# Patient Record
Sex: FEMALE | Race: WHITE | NOT HISPANIC OR LATINO | Employment: FULL TIME | URBAN - METROPOLITAN AREA
[De-identification: names, ages, dates, MRNs, and addresses within clinical notes are randomized per-mention and may not be internally consistent; named-entity substitution may affect disease eponyms.]

---

## 2017-04-28 ENCOUNTER — HOSPITAL ENCOUNTER (EMERGENCY)
Facility: HOSPITAL | Age: 67
Discharge: HOME | End: 2017-04-28
Attending: EMERGENCY MEDICINE
Payer: MEDICARE

## 2017-04-28 VITALS
WEIGHT: 290 LBS | DIASTOLIC BLOOD PRESSURE: 73 MMHG | BODY MASS INDEX: 42.95 KG/M2 | OXYGEN SATURATION: 95 % | RESPIRATION RATE: 18 BRPM | TEMPERATURE: 98 F | HEIGHT: 69 IN | SYSTOLIC BLOOD PRESSURE: 120 MMHG | HEART RATE: 70 BPM

## 2017-04-28 DIAGNOSIS — R19.7 DIARRHEA, UNSPECIFIED TYPE: Primary | ICD-10-CM

## 2017-04-28 LAB
ALBUMIN SERPL BCP-MCNC: 3.6 G/DL
ALP SERPL-CCNC: 63 U/L
ALT SERPL W/O P-5'-P-CCNC: 40 U/L
ANION GAP SERPL CALC-SCNC: 9 MMOL/L
AST SERPL-CCNC: 37 U/L
BILIRUB SERPL-MCNC: 0.7 MG/DL
BUN SERPL-MCNC: 12 MG/DL
CALCIUM SERPL-MCNC: 8.7 MG/DL
CHLORIDE SERPL-SCNC: 110 MMOL/L
CO2 SERPL-SCNC: 22 MMOL/L
CREAT SERPL-MCNC: 0.8 MG/DL
EST. GFR  (AFRICAN AMERICAN): >60 ML/MIN/1.73 M^2
EST. GFR  (NON AFRICAN AMERICAN): >60 ML/MIN/1.73 M^2
GLUCOSE SERPL-MCNC: 109 MG/DL
MAGNESIUM SERPL-MCNC: 2 MG/DL
POTASSIUM SERPL-SCNC: 3.7 MMOL/L
PROT SERPL-MCNC: 7.2 G/DL
SODIUM SERPL-SCNC: 141 MMOL/L

## 2017-04-28 PROCEDURE — 80053 COMPREHEN METABOLIC PANEL: CPT

## 2017-04-28 PROCEDURE — 36000 PLACE NEEDLE IN VEIN: CPT

## 2017-04-28 PROCEDURE — 83735 ASSAY OF MAGNESIUM: CPT

## 2017-04-28 PROCEDURE — 99284 EMERGENCY DEPT VISIT MOD MDM: CPT

## 2017-04-28 RX ORDER — METRONIDAZOLE 500 MG/1
500 TABLET ORAL 3 TIMES DAILY
Qty: 21 TABLET | Refills: 0 | Status: SHIPPED | OUTPATIENT
Start: 2017-04-28 | End: 2017-05-05

## 2017-04-28 RX ORDER — CIPROFLOXACIN 500 MG/1
500 TABLET ORAL 2 TIMES DAILY
Qty: 6 TABLET | Refills: 0 | Status: SHIPPED | OUTPATIENT
Start: 2017-04-28 | End: 2017-05-01

## 2017-04-28 RX ORDER — ONDANSETRON 4 MG/1
4 TABLET, FILM COATED ORAL EVERY 8 HOURS PRN
Qty: 12 TABLET | Refills: 0 | Status: SHIPPED | OUTPATIENT
Start: 2017-04-28 | End: 2017-12-31

## 2017-04-28 NOTE — ED AVS SNAPSHOT
[facility]  [place] [address]               Malka De   2017  8:40 AM   ED    Description:  Female : 1950   Department:  [facility]           Your Care was Coordinated By:     Provider Role From To    Anurag Armendariz MD Attending Provider [phone] --      Reason for Visit     Diarrhea           Diagnoses this Visit        Comments    Diarrhea, unspecified type    -  Primary       ED Disposition     None           To Do List           Follow-up Information     Follow up with Rony Chatman MD In 1 week.    Specialty:  Internal Medicine    Contact information:    [place] [address]  [phone]         These Medications        Disp Refills Start End    ciprofloxacin HCl (CIPRO) 500 MG tablet 6 tablet 0 2017    Take 1 tablet (500 mg total) by mouth 2 (two) times daily. - Oral    Pharmacy: [address] Ph #: [phone]       metronidazole (FLAGYL) 500 MG tablet 21 tablet 0 2017    Take 1 tablet (500 mg total) by mouth 3 (three) times daily. - Oral    Pharmacy: [address] Ph #: [phone]       ondansetron (ZOFRAN) 4 MG tablet 12 tablet 0 2017     Take 1 tablet (4 mg total) by mouth every 8 (eight) hours as needed (Nausea and vomiting). - Oral    Pharmacy: [address] Ph #: [phone]         [facility]     [facility] Care [facility] -  Assistance  Unless otherwise directed by your provider, please contact [facility], our nurse care line that is available for  assistance.     Registered nurses in the [facility] provide: appointment scheduling, clinical advisement, health education, and other advisory services.  Call: [phone] (toll free)               Medications          "  START taking these NEW medications        Refills    ciprofloxacin HCl (CIPRO) 500 MG tablet 0    Sig: Take 1 tablet (500 mg total) by mouth 2 (two) times daily.    Class: Print    Route: Oral    metronidazole (FLAGYL) 500 MG tablet 0    Sig: Take 1 tablet (500 mg total) by mouth 3 (three) times daily.    Class: Print    Route: Oral    ondansetron (ZOFRAN) 4 MG tablet 0    Sig: Take 1 tablet (4 mg total) by mouth every 8 (eight) hours as needed (Nausea and vomiting).    Class: Print    Route: Oral           Verify that the below list of medications is an accurate representation of the medications you are currently taking.  If none reported, the list may be blank. If incorrect, please contact your healthcare provider. Carry this list with you in case of emergency.           Current Medications     amlodipine (NORVASC) 2.5 MG tablet Take 2.5 mg by mouth once daily.    aspirin (ECOTRIN) 81 MG EC tablet Take 81 mg by mouth every other day.      ciprofloxacin HCl (CIPRO) 500 MG tablet Take 1 tablet (500 mg total) by mouth 2 (two) times daily.    clotrimazole-betamethasone 1-0.05% (LOTRISONE) cream Apply to affected areas twice a day    enalapril (VASOTEC) 5 MG tablet Take 5 mg by mouth once daily.    metronidazole (FLAGYL) 500 MG tablet Take 1 tablet (500 mg total) by mouth 3 (three) times daily.    ondansetron (ZOFRAN) 4 MG tablet Take 1 tablet (4 mg total) by mouth every 8 (eight) hours as needed (Nausea and vomiting).           Clinical Reference Information           Your Vitals Were     BP Pulse Temp Resp Height Weight    153/72 85 98.1 °F (36.7 °C) 18 5' 9" (1.753 m) 131.5 kg (290 lb)    SpO2 BMI             96% 42.83 kg/m2         Allergies as of 4/28/2017     No Known Allergies      Immunizations Administered on Date of Encounter - 4/28/2017     None      ED Micro, Lab, POCT     Start Ordered       Status Ordering Provider    04/28/17 0857 04/28/17 0856  Comprehensive metabolic panel  STAT      Final result     " 04/28/17 0857 04/28/17 0856  Magnesium  STAT      Final result     04/28/17 0857 04/28/17 0856  Stool culture **CANNOT BE ORDERED STAT**  Once      Acknowledged     04/28/17 0857 04/28/17 0856  Clostridium difficile EIA  Once      Acknowledged       ED Imaging Orders     None        Discharge Instructions       Clear liquids only well you have diarrhea.  You may use Imodium right ear.  If you diarrhea last longer than 5 days total, he may begin the antibiotics as above.  You may follow-up with her primary care doctor in a week.  Rest.  Return immediately if you get worse or if new problems develop.     [facility] complies with applicable Federal civil rights laws and does not discriminate on the basis of race, color, national origin, age, disability, or sex.        Language Assistance Services     ATTENTION: Language assistance services are available, free of charge. Please call [phone].      ATENCIÓN: Si habla español, tiene a murphy disposición servicios gratuitos de asistencia lingüística. Llame al [phone].     CHÚ Ý: N?u b?n nói Ti?ng Vi?t, có các d?ch v? h? tr? ngôn ng? mi?n phí dành cho b?n. G?i s? [phone].

## 2017-04-28 NOTE — ED PROVIDER NOTES
Encounter Date: 4/28/2017    SCRIBE #1 NOTE: I, Juan Fsusy Bazzi, am scribing for, and in the presence of, Anurag Armendariz MD. Other sections scribed: HPI, ROS.       History     Chief Complaint   Patient presents with    Diarrhea     States she has had diarrhea x 2 days     Review of patient's allergies indicates:  No Known Allergies  HPI Comments: CC: Diarrhea  HPI: This 66 y.o. female with HTN, borderline DM and Hx of tubal ligation presents to the ED c/o diarrhea (4-5 episodes daily) and associated nausea that began 3 mornings ago. Pt denies fever, abdominal pain, vomiting, cough, chest pain. Pt denies any recent abx use.    The history is provided by the patient.     Past Medical History:   Diagnosis Date    Hypertension      Past Surgical History:   Procedure Laterality Date    TUBAL LIGATION       Family History   Problem Relation Age of Onset    Cancer Mother     Cancer Father     Colon cancer Father     Cancer Brother     Diabetes Maternal Grandmother     Breast cancer Neg Hx     Ovarian cancer Neg Hx      Social History   Substance Use Topics    Smoking status: Never Smoker    Smokeless tobacco: None    Alcohol use No     Review of Systems   Constitutional: Negative for chills and fever.   HENT: Negative for congestion and sore throat.    Eyes: Negative for pain and visual disturbance.   Respiratory: Negative for cough and shortness of breath.    Cardiovascular: Negative for chest pain.   Gastrointestinal: Positive for diarrhea and nausea. Negative for abdominal pain and vomiting.   Genitourinary: Negative for difficulty urinating and dysuria.   Musculoskeletal: Negative for arthralgias and myalgias.   Skin: Negative for rash and wound.   Neurological: Negative for dizziness and headaches.       Physical Exam   Initial Vitals   BP Pulse Resp Temp SpO2   04/28/17 0832 04/28/17 0832 04/28/17 0832 04/28/17 0832 04/28/17 0832   153/72 85 18 98.1 °F (36.7 °C) 96 %     Physical Exam  The patient was  examined specifically for the following:   General:No significant distress, Good color, Warm and dry. Head and neck:Scalp atraumatic, Neck supple. Neurological:Appropriate conversation, Gross motor deficits. Eyes:Conjugate gaze, Clear corneas. ENT: No epistaxis. Cardiac: Regular rate and rhythm, Grossly normal heart tones. Pulmonary: Wheezing, Rales. Gastrointestinal: Abdominal tenderness, Abdominal distention. Musculoskeletal: Extremity deformity, Apparent pain with range of motion of the joints. Skin: Rash.   The findings on examination were normal except for the following: The patient is morbidly obese.  Lungs are clear the heart tones are normal the abdomen is nontender.  ED Course   Procedures  Labs Reviewed   COMPREHENSIVE METABOLIC PANEL - Abnormal; Notable for the following:        Result Value    CO2 22 (*)     All other components within normal limits   MAGNESIUM     Medical decision making: Given the above this patient is having 5 episodes of diarrhea day.  She is had her symptoms for 2 days.  I will give her Cipro and Flagyl prescription to use if she has diarrhea for 5 days.  I will treat the patient for nausea.  I will advise clear liquid diet until she is well.  There is no fever or tachycardia.  The patient is not clinically dehydrated.  There are no significant electrolyte abnormalities.  I'll have the patient return if she gets worse or if new problems develop.                     Scribe Attestation:   Scribe #1: I performed the above scribed service and the documentation accurately describes the services I performed. I attest to the accuracy of the note.    Attending Attestation:           Physician Attestation for Scribe:  Physician Attestation Statement for Scribe #1: I, Anurag Armendariz MD, reviewed documentation, as scribed by Juan F Bazzi in my presence, and it is both accurate and complete.                 ED Course     Clinical Impression:   The encounter diagnosis was Diarrhea, unspecified  type.          Anurag Armendariz MD  04/28/17 2005

## 2017-04-28 NOTE — ED TRIAGE NOTES
Pt arrived to ED due to diarrhea since Tuesday night. Denies abdominal pain. Denies Nausea at this time as well as abdominal pain. Pt denies fever and chills

## 2017-04-28 NOTE — DISCHARGE INSTRUCTIONS
Clear liquids only well you have diarrhea.  You may use Imodium right ear.  If you diarrhea last longer than 5 days total, he may begin the antibiotics as above.  You may follow-up with her primary care doctor in a week.  Rest.  Return immediately if you get worse or if new problems develop.

## 2017-12-31 ENCOUNTER — HOSPITAL ENCOUNTER (EMERGENCY)
Facility: HOSPITAL | Age: 67
Discharge: HOME | End: 2017-12-31
Attending: EMERGENCY MEDICINE
Payer: MEDICARE

## 2017-12-31 VITALS
RESPIRATION RATE: 20 BRPM | BODY MASS INDEX: 44.41 KG/M2 | HEIGHT: 68 IN | SYSTOLIC BLOOD PRESSURE: 122 MMHG | OXYGEN SATURATION: 96 % | HEART RATE: 84 BPM | TEMPERATURE: 98 F | WEIGHT: 293 LBS | DIASTOLIC BLOOD PRESSURE: 59 MMHG

## 2017-12-31 DIAGNOSIS — J10.1 INFLUENZA A: ICD-10-CM

## 2017-12-31 DIAGNOSIS — B34.9 VIRAL ILLNESS: Primary | ICD-10-CM

## 2017-12-31 LAB
BILIRUB UR QL STRIP: NEGATIVE
CLARITY UR: ABNORMAL
COLOR UR: YELLOW
FLUAV AG SPEC QL IA: POSITIVE
FLUBV AG SPEC QL IA: NEGATIVE
GLUCOSE UR QL STRIP: NEGATIVE
HGB UR QL STRIP: NEGATIVE
KETONES UR QL STRIP: NEGATIVE
LEUKOCYTE ESTERASE UR QL STRIP: NEGATIVE
NITRITE UR QL STRIP: NEGATIVE
PH UR STRIP: 6 [PH] (ref 5–8)
POCT GLUCOSE: 123 MG/DL (ref 70–110)
PROT UR QL STRIP: NEGATIVE
SP GR UR STRIP: 1.01 (ref 1–1.03)
SPECIMEN SOURCE: ABNORMAL
URN SPEC COLLECT METH UR: ABNORMAL
UROBILINOGEN UR STRIP-ACNC: NEGATIVE EU/DL

## 2017-12-31 PROCEDURE — 96360 HYDRATION IV INFUSION INIT: CPT

## 2017-12-31 PROCEDURE — 82962 GLUCOSE BLOOD TEST: CPT

## 2017-12-31 PROCEDURE — 81003 URINALYSIS AUTO W/O SCOPE: CPT

## 2017-12-31 PROCEDURE — 87400 INFLUENZA A/B EACH AG IA: CPT | Mod: 59

## 2017-12-31 PROCEDURE — 99284 EMERGENCY DEPT VISIT MOD MDM: CPT | Mod: 25

## 2017-12-31 PROCEDURE — 25000003 PHARM REV CODE 250: Performed by: PHYSICIAN ASSISTANT

## 2017-12-31 RX ORDER — CETIRIZINE HYDROCHLORIDE 10 MG/1
10 TABLET ORAL
Status: COMPLETED | OUTPATIENT
Start: 2017-12-31 | End: 2017-12-31

## 2017-12-31 RX ORDER — CETIRIZINE HYDROCHLORIDE 10 MG/1
10 TABLET ORAL DAILY
Qty: 30 TABLET | Refills: 0 | Status: SHIPPED | OUTPATIENT
Start: 2017-12-31 | End: 2018-08-24

## 2017-12-31 RX ORDER — HYDROCODONE BITARTRATE AND HOMATROPINE METHYLBROMIDE ORAL SOLUTION 5; 1.5 MG/5ML; MG/5ML
5 LIQUID ORAL EVERY 6 HOURS PRN
Qty: 120 ML | Refills: 0 | Status: SHIPPED | OUTPATIENT
Start: 2017-12-31 | End: 2018-08-24

## 2017-12-31 RX ORDER — GUAIFENESIN/DEXTROMETHORPHAN 100-10MG/5
10 SYRUP ORAL ONCE
Status: COMPLETED | OUTPATIENT
Start: 2017-12-31 | End: 2017-12-31

## 2017-12-31 RX ORDER — ACETAMINOPHEN 500 MG
500 TABLET ORAL EVERY 6 HOURS PRN
Qty: 20 TABLET | Refills: 0 | Status: SHIPPED | OUTPATIENT
Start: 2017-12-31

## 2017-12-31 RX ORDER — GUAIFENESIN 600 MG/1
1200 TABLET, EXTENDED RELEASE ORAL 2 TIMES DAILY
COMMUNITY
End: 2018-08-24

## 2017-12-31 RX ADMIN — GUAIFENESIN AND DEXTROMETHORPHAN 10 ML: 100; 10 SYRUP ORAL at 07:12

## 2017-12-31 RX ADMIN — CETIRIZINE HYDROCHLORIDE 10 MG: 10 TABLET, FILM COATED ORAL at 07:12

## 2017-12-31 RX ADMIN — SODIUM CHLORIDE 1000 ML: 0.9 INJECTION, SOLUTION INTRAVENOUS at 07:12

## 2017-12-31 NOTE — DISCHARGE INSTRUCTIONS
You have been evaluated in the emergency department.  You are being treated for influenza.    Drink lots of fluids.  Get good rest.  Continue with Tylenol for fever or pain.  Hycodan for cough, especially in the evenings.  Be aware, Hycodan is sedating.  Do not mix with other sedating medications.  Zyrtec daily.    Follow-up with your primary care physician early this week for reevaluation and further management.    Return to this ED if fever persists despite treatment, if you begin with worsening cough, if you begin with chest pain or shortness of breath, if any other problems occur.

## 2017-12-31 NOTE — ED PROVIDER NOTES
Encounter Date: 12/31/2017    SCRIBE #1 NOTE: I, Sharon Tipton, am scribing for, and in the presence of,  CRISTINA Cox. I have scribed the following portions of the note - Other sections scribed: HPI and ROS.       History     Chief Complaint   Patient presents with    Fever     chills, fever, nonproductive cough, chest pain from coughing, sore throat, diarrhea, urinary frequency x2 days; 8:30pm took tylenol     Chief Complaint: Cough    HPI: This 67 y.o. Female with HTN presents to the ED c/o a non productive cough. Symptoms began 3 days ago. Cough is constant, severe, and has progressively worsened since onset. There's associated appetite decrease, generalized body aches, dizziness, generalized weakness, and chest pain secondary to cough. There's no attempted treatment. Symptoms have gradually worsened since onset. Patient also reports urinary frequency and endorses being on antibiotic treatment secondary to an UTI 1 week ago. At this time, she denies fever, chills, nausea, vomiting, diarrhea, abdominal pain, or dysuria. No recent hospitalizations.         The history is provided by the patient. No  was used.     Review of patient's allergies indicates:  No Known Allergies  Past Medical History:   Diagnosis Date    Hypertension      Past Surgical History:   Procedure Laterality Date    TUBAL LIGATION       Family History   Problem Relation Age of Onset    Cancer Mother     Cancer Father     Colon cancer Father     Cancer Brother     Diabetes Maternal Grandmother     Breast cancer Neg Hx     Ovarian cancer Neg Hx      Social History   Substance Use Topics    Smoking status: Never Smoker    Smokeless tobacco: Not on file    Alcohol use No     Review of Systems   Constitutional: Positive for appetite change. Negative for chills and fever.   HENT: Negative for ear pain and sore throat.    Eyes: Negative for pain.   Respiratory: Positive for cough. Negative for shortness of  breath.    Cardiovascular: Positive for chest pain.   Gastrointestinal: Negative for abdominal pain, diarrhea, nausea and vomiting.   Genitourinary: Positive for frequency. Negative for dysuria and hematuria.   Musculoskeletal: Positive for myalgias (arm or leg pain).   Skin: Negative for rash.   Neurological: Positive for dizziness and weakness. Negative for headaches.       Physical Exam     Initial Vitals   BP Pulse Resp Temp SpO2   12/31/17 0404 12/31/17 0404 12/31/17 0404 12/31/17 0404 12/31/17 0409   (!) 141/63 101 16 98.5 °F (36.9 °C) (!) 94 %      MAP       12/31/17 0404       89         Physical Exam    Nursing note and vitals reviewed.  Constitutional: She appears well-developed and well-nourished. She is not diaphoretic. No distress.   HENT:   Head: Normocephalic and atraumatic.   Right Ear: External ear normal.   Left Ear: External ear normal.   Mouth/Throat: Oropharynx is clear and moist.   Eyes: Conjunctivae and EOM are normal. Pupils are equal, round, and reactive to light.   Neck: Normal range of motion. Neck supple. No tracheal deviation present.   Cardiovascular: Normal heart sounds and intact distal pulses.   Pulmonary/Chest: Breath sounds normal. No stridor. No respiratory distress. She has no wheezes. She has no rhonchi. She exhibits no tenderness.   Lungs overall clear.  Upper airway noise.   Abdominal: Soft. Bowel sounds are normal. She exhibits no distension. There is no tenderness.   Musculoskeletal: Normal range of motion. She exhibits no tenderness.   Lymphadenopathy:     She has no cervical adenopathy.   Neurological: She is alert and oriented to person, place, and time.   Skin: Skin is warm and dry. Capillary refill takes less than 2 seconds.   Psychiatric: She has a normal mood and affect. Her behavior is normal. Judgment and thought content normal.         ED Course   Procedures  Labs Reviewed   INFLUENZA A AND B ANTIGEN - Abnormal; Notable for the following:        Result Value     Influenza A Ag, EIA Positive (*)     All other components within normal limits   URINALYSIS - Abnormal; Notable for the following:     Appearance, UA Hazy (*)     All other components within normal limits     EKG Readings: (Independently Interpreted)   EKG in normal sinus rhythm, ventricular rate 99 bpm.  Normal WV, normal QT interval.  No evidence of ischemia, arrhythmia, or heart block.  No ST elevation.          Medical Decision Making:   Initial Assessment:   67-year-old female chief complaint nonproductive cough, myalgias, weakness, decreased appetite, all ×4 days.  Differential Diagnosis:   Viral URI, pharyngitis, otitis media, otitis externa, pneumonia, bronchitis  ED Management:  Patient overall well-appearing, in no acute distress, afebrile, vitals within normal limits.    Patient presents to ED complaining of various URI-like symptoms ×4 days.  Patient admits to decreased by mouth intake, weakness, nonproductive cough, myalgias.  No treatment attempted prior to arrival.  She denies shortness of breath.  She denies chest pain, however does admit to pain to chest wall during cough.  Her EKG is negative.  She has a history of hypertension but no other significant cardiac issues.  I do not suspect cardiogenic process at this time.  Chest x-ray negative for consolidation, effusion, pneumothorax, or obvious bony abnormality.  Her lungs are clear bilaterally.  I do not suspect pneumonia or pleural process at this time.  Patient is overall well-appearing and nontoxic.  She is tachycardic, therefore IV fluids given.  She is afebrile.  No rib retractions or nasal flaring, no tachypnea.  SPO2 96% on room air.  She is no respiratory distress.  I do suspect viral process as culprit of patient's symptoms.    Influenza swab positive for flu a.  I will treat supportively, provide Hycodan cough syrup to help with cough especially in the evenings, and have her follow-up with her primary early next week.  She does understand  and agree with treatment plan.  I do feel she is safe and stable for discharge without further intervention and management as an outpatient.  Return precautions given.  Other:   I have discussed this case with another health care provider.       <> Summary of the Discussion: I discussed this case with Dr. Armendariz.            Scribe Attestation:   Scribe #1: I performed the above scribed service and the documentation accurately describes the services I performed. I attest to the accuracy of the note.    Attending Attestation:     Physician Attestation Statement for NP/PA:   I discussed this assessment and plan of this patient with the NP/PA, but I did not personally examine the patient. The face to face encounter was performed by the NP/PA.        Physician Attestation for Scribe:  Physician Attestation Statement for Scribe #1: I, CRISTINA Cox, reviewed documentation, as scribed by Sharon Tipton in my presence, and it is both accurate and complete.                 ED Course      Clinical Impression:   The primary encounter diagnosis was Viral illness. A diagnosis of Influenza A was also pertinent to this visit.    Disposition:   Disposition: Discharged  Condition: Stable                        SERENA MartinezC  12/31/17 1158       Anurag Armendariz MD  01/02/18 0811

## 2018-08-24 ENCOUNTER — HOSPITAL ENCOUNTER (EMERGENCY)
Facility: HOSPITAL | Age: 68
Discharge: HOME | End: 2018-08-24
Attending: EMERGENCY MEDICINE
Payer: MEDICARE

## 2018-08-24 VITALS
OXYGEN SATURATION: 92 % | WEIGHT: 250 LBS | TEMPERATURE: 99 F | HEART RATE: 81 BPM | DIASTOLIC BLOOD PRESSURE: 67 MMHG | BODY MASS INDEX: 37.03 KG/M2 | HEIGHT: 69 IN | RESPIRATION RATE: 20 BRPM | SYSTOLIC BLOOD PRESSURE: 144 MMHG

## 2018-08-24 DIAGNOSIS — S16.1XXA STRAIN OF NECK MUSCLE, INITIAL ENCOUNTER: ICD-10-CM
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
## 2018-08-24 DIAGNOSIS — M25.512 LEFT SHOULDER PAIN: ICD-10-CM

## 2018-08-24 DIAGNOSIS — S46.912A STRAIN OF LEFT SHOULDER, INITIAL ENCOUNTER: ICD-10-CM

## 2018-08-24 PROCEDURE — 63600175 PHARM REV CODE 636 W HCPCS: Performed by: NURSE PRACTITIONER

## 2018-08-24 PROCEDURE — 99283 EMERGENCY DEPT VISIT LOW MDM: CPT | Mod: 25

## 2018-08-24 PROCEDURE — 96372 THER/PROPH/DIAG INJ SC/IM: CPT

## 2018-08-24 RX ORDER — KETOROLAC TROMETHAMINE 30 MG/ML
30 INJECTION, SOLUTION INTRAMUSCULAR; INTRAVENOUS
Status: COMPLETED | OUTPATIENT
Start: 2018-08-24 | End: 2018-08-24

## 2018-08-24 RX ORDER — ASPIRIN 81 MG/1
81 TABLET ORAL DAILY
COMMUNITY

## 2018-08-24 RX ORDER — NAPROXEN 500 MG/1
500 TABLET ORAL 2 TIMES DAILY PRN
Qty: 15 TABLET | Refills: 0 | Status: SHIPPED | OUTPATIENT
Start: 2018-08-24

## 2018-08-24 RX ORDER — TIZANIDINE 4 MG/1
4 TABLET ORAL EVERY 8 HOURS PRN
Qty: 20 TABLET | Refills: 0 | Status: SHIPPED | OUTPATIENT
Start: 2018-08-24

## 2018-08-24 RX ADMIN — KETOROLAC TROMETHAMINE 30 MG: 30 INJECTION INTRAMUSCULAR; INTRAVENOUS at 02:08
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
## 2018-08-24 NOTE — DISCHARGE INSTRUCTIONS
Take pain medications as needed only as prescribed.    Follow-up with her primary physician for further management if your symptoms do not improve.    Return to the emergency department for any new or worsening symptoms or as needed.

## 2020-03-20 ENCOUNTER — HOSPITAL ENCOUNTER (EMERGENCY)
Facility: HOSPITAL | Age: 70
Discharge: HOME | End: 2020-03-20
Attending: EMERGENCY MEDICINE
Payer: MEDICARE

## 2020-03-20 VITALS
TEMPERATURE: 98 F | WEIGHT: 280 LBS | OXYGEN SATURATION: 99 % | SYSTOLIC BLOOD PRESSURE: 151 MMHG | DIASTOLIC BLOOD PRESSURE: 70 MMHG | HEART RATE: 93 BPM | RESPIRATION RATE: 17 BRPM | BODY MASS INDEX: 41.35 KG/M2

## 2020-03-20 DIAGNOSIS — N39.0 URINARY TRACT INFECTION WITHOUT HEMATURIA, SITE UNSPECIFIED: ICD-10-CM

## 2020-03-20 DIAGNOSIS — R53.1 WEAKNESS: Primary | ICD-10-CM

## 2020-03-20 LAB
ALBUMIN SERPL BCP-MCNC: 4.4 G/DL (ref 3.5–5.2)
ALP SERPL-CCNC: 99 U/L (ref 55–135)
ALT SERPL W/O P-5'-P-CCNC: 30 U/L (ref 10–44)
ANION GAP SERPL CALC-SCNC: 10 MMOL/L (ref 8–16)
AST SERPL-CCNC: 26 U/L (ref 10–40)
BACTERIA #/AREA URNS HPF: ABNORMAL /HPF
BASOPHILS # BLD AUTO: 0.03 K/UL (ref 0–0.2)
BASOPHILS NFR BLD: 0.3 % (ref 0–1.9)
BILIRUB SERPL-MCNC: 0.6 MG/DL (ref 0.1–1)
BILIRUB UR QL STRIP: NEGATIVE
BNP SERPL-MCNC: 21 PG/ML (ref 0–99)
BUN SERPL-MCNC: 12 MG/DL (ref 8–23)
CALCIUM SERPL-MCNC: 9.9 MG/DL (ref 8.7–10.5)
CHLORIDE SERPL-SCNC: 105 MMOL/L (ref 95–110)
CLARITY UR: ABNORMAL
CO2 SERPL-SCNC: 26 MMOL/L (ref 23–29)
COLOR UR: YELLOW
CREAT SERPL-MCNC: 1 MG/DL (ref 0.5–1.4)
DIFFERENTIAL METHOD: NORMAL
EOSINOPHIL # BLD AUTO: 0.1 K/UL (ref 0–0.5)
EOSINOPHIL NFR BLD: 0.6 % (ref 0–8)
ERYTHROCYTE [DISTWIDTH] IN BLOOD BY AUTOMATED COUNT: 13.2 % (ref 11.5–14.5)
EST. GFR  (AFRICAN AMERICAN): >60 ML/MIN/1.73 M^2
EST. GFR  (NON AFRICAN AMERICAN): 58 ML/MIN/1.73 M^2
GLUCOSE SERPL-MCNC: 111 MG/DL (ref 70–110)
GLUCOSE UR QL STRIP: NEGATIVE
HCT VFR BLD AUTO: 47.8 % (ref 37–48.5)
HGB BLD-MCNC: 15.7 G/DL (ref 12–16)
HGB UR QL STRIP: NEGATIVE
IMM GRANULOCYTES # BLD AUTO: 0.02 K/UL (ref 0–0.04)
IMM GRANULOCYTES NFR BLD AUTO: 0.2 % (ref 0–0.5)
KETONES UR QL STRIP: NEGATIVE
LEUKOCYTE ESTERASE UR QL STRIP: ABNORMAL
LYMPHOCYTES # BLD AUTO: 1.9 K/UL (ref 1–4.8)
LYMPHOCYTES NFR BLD: 19.5 % (ref 18–48)
MCH RBC QN AUTO: 29.7 PG (ref 27–31)
MCHC RBC AUTO-ENTMCNC: 32.8 G/DL (ref 32–36)
MCV RBC AUTO: 91 FL (ref 82–98)
MICROSCOPIC COMMENT: ABNORMAL
MONOCYTES # BLD AUTO: 1 K/UL (ref 0.3–1)
MONOCYTES NFR BLD: 10 % (ref 4–15)
NEUTROPHILS # BLD AUTO: 6.7 K/UL (ref 1.8–7.7)
NEUTROPHILS NFR BLD: 69.4 % (ref 38–73)
NITRITE UR QL STRIP: NEGATIVE
NRBC BLD-RTO: 0 /100 WBC
PH UR STRIP: 7 [PH] (ref 5–8)
PLATELET # BLD AUTO: 187 K/UL (ref 150–350)
PMV BLD AUTO: 11.1 FL (ref 9.2–12.9)
POTASSIUM SERPL-SCNC: 3.8 MMOL/L (ref 3.5–5.1)
PROT SERPL-MCNC: 8.2 G/DL (ref 6–8.4)
PROT UR QL STRIP: NEGATIVE
RBC # BLD AUTO: 5.28 M/UL (ref 4–5.4)
RBC #/AREA URNS HPF: 3 /HPF (ref 0–4)
SODIUM SERPL-SCNC: 141 MMOL/L (ref 136–145)
SP GR UR STRIP: 1.01 (ref 1–1.03)
SQUAMOUS #/AREA URNS HPF: 4 /HPF
TROPONIN I SERPL DL<=0.01 NG/ML-MCNC: <0.006 NG/ML (ref 0–0.03)
URN SPEC COLLECT METH UR: ABNORMAL
UROBILINOGEN UR STRIP-ACNC: NEGATIVE EU/DL
WBC # BLD AUTO: 9.62 K/UL (ref 3.9–12.7)
WBC #/AREA URNS HPF: 4 /HPF (ref 0–5)

## 2020-03-20 PROCEDURE — 81000 URINALYSIS NONAUTO W/SCOPE: CPT

## 2020-03-20 PROCEDURE — 85025 COMPLETE CBC W/AUTO DIFF WBC: CPT

## 2020-03-20 PROCEDURE — 63600175 PHARM REV CODE 636 W HCPCS: Performed by: EMERGENCY MEDICINE

## 2020-03-20 PROCEDURE — 84484 ASSAY OF TROPONIN QUANT: CPT

## 2020-03-20 PROCEDURE — 96360 HYDRATION IV INFUSION INIT: CPT

## 2020-03-20 PROCEDURE — 99285 EMERGENCY DEPT VISIT HI MDM: CPT | Mod: 25

## 2020-03-20 PROCEDURE — 96361 HYDRATE IV INFUSION ADD-ON: CPT

## 2020-03-20 PROCEDURE — 80053 COMPREHEN METABOLIC PANEL: CPT

## 2020-03-20 PROCEDURE — 25000003 PHARM REV CODE 250: Performed by: EMERGENCY MEDICINE

## 2020-03-20 PROCEDURE — 83880 ASSAY OF NATRIURETIC PEPTIDE: CPT

## 2020-03-20 PROCEDURE — 93010 EKG 12-LEAD: ICD-10-PCS | Mod: ,,, | Performed by: INTERNAL MEDICINE

## 2020-03-20 PROCEDURE — 93010 ELECTROCARDIOGRAM REPORT: CPT | Mod: ,,, | Performed by: INTERNAL MEDICINE

## 2020-03-20 PROCEDURE — 93005 ELECTROCARDIOGRAM TRACING: CPT

## 2020-03-20 RX ORDER — HYDROXYZINE PAMOATE 25 MG/1
25 CAPSULE ORAL
Status: COMPLETED | OUTPATIENT
Start: 2020-03-20 | End: 2020-03-20

## 2020-03-20 RX ORDER — HYDROXYZINE HYDROCHLORIDE 25 MG/1
25 TABLET, FILM COATED ORAL EVERY 6 HOURS
Qty: 12 TABLET | Refills: 0 | Status: SHIPPED | OUTPATIENT
Start: 2020-03-20

## 2020-03-20 RX ORDER — ONDANSETRON 4 MG/1
4 TABLET, FILM COATED ORAL
COMMUNITY
Start: 2020-03-17 | End: 2020-03-27

## 2020-03-20 RX ORDER — BUPROPION HYDROCHLORIDE 300 MG/1
300 TABLET ORAL
COMMUNITY
Start: 2019-12-19

## 2020-03-20 RX ORDER — CEPHALEXIN 500 MG/1
500 CAPSULE ORAL 2 TIMES DAILY
Qty: 10 CAPSULE | Refills: 0 | Status: SHIPPED | OUTPATIENT
Start: 2020-03-20 | End: 2020-03-25

## 2020-03-20 RX ORDER — BUSPIRONE HYDROCHLORIDE 10 MG/1
10 TABLET ORAL
COMMUNITY
Start: 2019-12-19 | End: 2020-12-18

## 2020-03-20 RX ORDER — HYDROXYZINE HYDROCHLORIDE 25 MG/1
25 TABLET, FILM COATED ORAL EVERY 6 HOURS
Qty: 12 TABLET | Refills: 0 | Status: SHIPPED | OUTPATIENT
Start: 2020-03-20 | End: 2020-03-20 | Stop reason: SDUPTHER

## 2020-03-20 RX ORDER — CEPHALEXIN 250 MG/1
500 CAPSULE ORAL
Status: COMPLETED | OUTPATIENT
Start: 2020-03-20 | End: 2020-03-20

## 2020-03-20 RX ORDER — CEPHALEXIN 500 MG/1
500 CAPSULE ORAL 2 TIMES DAILY
Qty: 10 CAPSULE | Refills: 0 | Status: SHIPPED | OUTPATIENT
Start: 2020-03-20 | End: 2020-03-20 | Stop reason: SDUPTHER

## 2020-03-20 RX ORDER — ONDANSETRON 2 MG/ML
4 INJECTION INTRAMUSCULAR; INTRAVENOUS
Status: DISCONTINUED | OUTPATIENT
Start: 2020-03-20 | End: 2020-03-20 | Stop reason: HOSPADM

## 2020-03-20 RX ADMIN — SODIUM CHLORIDE 1000 ML: 0.9 INJECTION, SOLUTION INTRAVENOUS at 05:03

## 2020-03-20 RX ADMIN — HYDROXYZINE PAMOATE 25 MG: 25 CAPSULE ORAL at 06:03

## 2020-03-20 RX ADMIN — CEPHALEXIN 500 MG: 250 CAPSULE ORAL at 07:03

## 2020-03-20 NOTE — ED PROVIDER NOTES
"Encounter Date: 3/20/2020    SCRIBE #1 NOTE: I, Thao Matta, am scribing for, and in the presence of,  Hakan Roberson MD. I have scribed the following portions of the note - Other sections scribed: HPI/ROS/PE.       History     Chief Complaint   Patient presents with    Abnormal EKG     at Urgent care, sent her over here to have further testing. pt states she originally went in for anxiety symptoms and nausea. pt denies chest pain.      This 69 y.o. female with a medical history of GERD and HTN presents to the ED for an emergent evaluation following an abnormal EKG. Pt reports she was evaluated at an Urgent Care earlier today for anxiety-related symptoms. She had an EKG performed and was sent to the ED for a further evaluation. Pt reports she has been feeling "sick to my stomach" noting she has been panicking. Pt reports nausea for the last 3 days or so as well as "sweats." Pt notes a hx of depression stating she takes anti-depressants. Pt states that she took x1 tablet of an anti-anxiety medication today. Pt states that her son passed way ~1 year ago to which is making her depressed. No known allergies to medications. No alleviating factors. Otherwise, pt denies n/v, fever, chills, abdominal pain, chest pain, SOB, nasal congestion, and any other associated symptoms.    The history is provided by the patient. No  was used.     Review of patient's allergies indicates:  No Known Allergies  Past Medical History:   Diagnosis Date    GERD (gastroesophageal reflux disease)     Hypertension      Past Surgical History:   Procedure Laterality Date    TUBAL LIGATION       Family History   Problem Relation Age of Onset    Cancer Mother     Cancer Father     Colon cancer Father     Cancer Brother     Diabetes Maternal Grandmother     Breast cancer Neg Hx     Ovarian cancer Neg Hx      Social History     Tobacco Use    Smoking status: Never Smoker    Smokeless tobacco: Never Used " "  Substance Use Topics    Alcohol use: No    Drug use: No     Review of Systems   Constitutional: Positive for diaphoresis ("sweats").   HENT: Negative.    Eyes: Negative.    Respiratory: Negative.    Cardiovascular: Negative.    Gastrointestinal: Positive for nausea.   Genitourinary: Negative.    Musculoskeletal: Negative.    Skin: Negative.    Neurological: Negative.    Psychiatric/Behavioral: Positive for dysphoric mood. The patient is nervous/anxious.        Physical Exam     Initial Vitals [03/20/20 1639]   BP Pulse Resp Temp SpO2   (!) 161/92 92 19 98.4 °F (36.9 °C) 95 %      MAP       --         Physical Exam    Nursing note and vitals reviewed.  Constitutional: She appears well-developed and well-nourished. She is not diaphoretic. No distress.   HENT:   Head: Atraumatic.   Eyes: EOM are normal.   Neck: Normal range of motion. Neck supple.   Cardiovascular: Normal rate and regular rhythm.   Pulmonary/Chest: Breath sounds normal. No respiratory distress.   Abdominal: Soft. There is no tenderness.   Musculoskeletal: She exhibits no edema.   Neurological: She is alert and oriented to person, place, and time.   Skin: Skin is warm and dry. No rash noted.   Psychiatric: Her mood appears anxious.         ED Course   Procedures  Labs Reviewed   COMPREHENSIVE METABOLIC PANEL - Abnormal; Notable for the following components:       Result Value    Glucose 111 (*)     eGFR if non  58 (*)     All other components within normal limits   URINALYSIS, REFLEX TO URINE CULTURE - Abnormal; Notable for the following components:    Appearance, UA Hazy (*)     Leukocytes, UA Trace (*)     All other components within normal limits    Narrative:     Preferred Collection Type->Urine, Clean Catch   URINALYSIS MICROSCOPIC - Abnormal; Notable for the following components:    Bacteria Many (*)     All other components within normal limits    Narrative:     Preferred Collection Type->Urine, Clean Catch   CBC W/ AUTO " DIFFERENTIAL   TROPONIN I   B-TYPE NATRIURETIC PEPTIDE          Imaging Results          X-Ray Chest AP Portable (Final result)  Result time 03/20/20 18:22:34    Final result by Travis Crook MD (03/20/20 18:22:34)                 Impression:      1. Slight vascular crowding projected over the right lower lung zone, likely related to shallow inspiratory effort and or atelectasis however developing consolidation is not excluded.  Correlation is recommended.  PA and lateral as warranted.      Electronically signed by: Travis Crook MD  Date:    03/20/2020  Time:    18:22             Narrative:    EXAMINATION:  XR CHEST AP PORTABLE    CLINICAL HISTORY:  Weakness    TECHNIQUE:  Single frontal view of the chest was performed.    COMPARISON:  12/31/2017    FINDINGS:  The cardiomediastinal silhouette is not enlarged.  There is no pleural effusion.  The trachea is midline.  The lungs are symmetrically expanded bilaterally with mild vascular crowding projected over the right lower lung zone.  There is left basilar subsegmental atelectasis..  No large focal consolidation seen.  There is no pneumothorax.  The osseous structures are remarkable for degenerative changes..                                            Scribe Attestation:   Scribe #1: I performed the above scribed service and the documentation accurately describes the services I performed. I attest to the accuracy of the note.      Pt presentign with some anxiety and nausea from UC, with concerns for abnormal EKG.  EKG showing nsr, RBBB, normal rate, no ischemic pattern noted, no STEMI.  ED workup showing neg trop, BNP wnl, CBC/CMP wnl, ua unremarkable.  CXR unremarkable.  Pt with stable vitals, given atarax/zofran with improvement in symptoms.  With bacteruria on u/a and some mild sxms reported by patient will place on short course of keflex.  Discussed extensively bloodwork, and findings with patient and plan for further management at home.  Discussed  diagnosis and further treatment with patient, including f/u with PCP in the next week.  Return precautions given and all questions answered.  Patient in understanding of plan.  Pt discharged to home improved and stable.                        Clinical Impression:       ICD-10-CM ICD-9-CM   1. Weakness R53.1 780.79   2. Urinary tract infection without hematuria, site unspecified N39.0 599.0       Scribe Attestation: I, Hakan Roberson M.D., personally performed the services described in this documentation. All medical record entries made by the scribe were at my direction and in my presence. I have reviewed the chart and agree that the record reflects my personal performance and is accurate and complete.      ED Disposition Condition    Discharge Stable        ED Prescriptions     Medication Sig Dispense Start Date End Date Auth. Provider    cephALEXin (KEFLEX) 500 MG capsule  (Status: Discontinued) Take 1 capsule (500 mg total) by mouth 2 (two) times daily. for 5 days 10 capsule 3/20/2020 3/20/2020 Hakan Roberson MD    hydroxyzine HCL (ATARAX) 25 MG tablet  (Status: Discontinued) Take 1 tablet (25 mg total) by mouth every 6 (six) hours. 12 tablet 3/20/2020 3/20/2020 Hakan Roberson MD    cephALEXin (KEFLEX) 500 MG capsule Take 1 capsule (500 mg total) by mouth 2 (two) times daily. for 5 days 10 capsule 3/20/2020 3/25/2020 Hakan Roberson MD    hydroxyzine HCL (ATARAX) 25 MG tablet Take 1 tablet (25 mg total) by mouth every 6 (six) hours. 12 tablet 3/20/2020  Hakan Roberson MD        Follow-up Information     Follow up With Specialties Details Why Contact Info    [facility] Emergency Medicine Go to  If symptoms worsen [address]  [facility] [address] [phone]    Fernanda Chatman MD Internal Medicine Go in 1 week As needed [address] [place] [address]  [phone]                                       Hakan Roberson MD  03/22/20 1904

## 2020-04-22 ENCOUNTER — TELEPHONE (OUTPATIENT)
Dept: SURGERY | Facility: CLINIC | Age: 70
End: 2020-04-22

## 2020-04-22 NOTE — TELEPHONE ENCOUNTER
Pt notified of appt with  4/27/20 @ 2:00pm    ----- Message from Cathy Moreno sent at 4/22/2020  9:45 AM CDT -----  Contact: Self [phone]  Type: Patient Call Back    Who called: Self    What is the request in detail: pt is calling because she needs to reschedule the video appt that she cancelled because she is still having abdominal pain and the next available is 5/19 and she states that she can not wait that long    Can the clinic reply by [facility]? Call back    Would the patient rather a call back or a response via [facility]? Call back    Best call back number: [phone]

## 2020-04-27 ENCOUNTER — HOSPITAL ENCOUNTER (OUTPATIENT)
Dept: RADIOLOGY | Facility: HOSPITAL | Age: 70
Discharge: HOME | End: 2020-04-27
Attending: SURGERY
Payer: MEDICARE

## 2020-04-27 ENCOUNTER — PATIENT MESSAGE (OUTPATIENT)
Dept: SURGERY | Facility: CLINIC | Age: 70
End: 2020-04-27

## 2020-04-27 ENCOUNTER — OFFICE VISIT (OUTPATIENT)
Dept: SURGERY | Facility: CLINIC | Age: 70
End: 2020-04-27
Payer: MEDICARE

## 2020-04-27 VITALS
DIASTOLIC BLOOD PRESSURE: 67 MMHG | HEIGHT: 69 IN | HEART RATE: 103 BPM | WEIGHT: 265.31 LBS | SYSTOLIC BLOOD PRESSURE: 92 MMHG | BODY MASS INDEX: 39.29 KG/M2

## 2020-04-27 DIAGNOSIS — R10.10 PAIN OF UPPER ABDOMEN: Primary | ICD-10-CM

## 2020-04-27 DIAGNOSIS — R10.10 PAIN OF UPPER ABDOMEN: ICD-10-CM

## 2020-04-27 PROCEDURE — 76705 US ABDOMEN LIMITED_GALLBLADDER: ICD-10-PCS | Mod: 26,,, | Performed by: RADIOLOGY

## 2020-04-27 PROCEDURE — 99204 OFFICE O/P NEW MOD 45 MIN: CPT | Mod: S$GLB,,, | Performed by: SURGERY

## 2020-04-27 PROCEDURE — 76705 ECHO EXAM OF ABDOMEN: CPT | Mod: TC

## 2020-04-27 PROCEDURE — 76705 ECHO EXAM OF ABDOMEN: CPT | Mod: 26,,, | Performed by: RADIOLOGY

## 2020-04-27 PROCEDURE — 99204 PR OFFICE/OUTPT VISIT, NEW, LEVL IV, 45-59 MIN: ICD-10-PCS | Mod: S$GLB,,, | Performed by: SURGERY

## 2020-04-27 RX ORDER — TRAZODONE HYDROCHLORIDE 50 MG/1
50 TABLET ORAL NIGHTLY
COMMUNITY
Start: 2020-04-06

## 2020-04-27 NOTE — PROGRESS NOTES
Subjective:       Patient ID: Malka De is a 69 y.o. female.    Chief Complaint: Consult; Abdominal Pain; Dizziness; and Nausea    HPI 68 yo female with abdominal pain in her epigastric area for weeks with associated nausea but no vomiting.  Her pain is also associated with dizziness and some anxiety  Review of Systems   Constitutional: Negative.  Negative for fever.   HENT: Negative.    Eyes: Negative.    Respiratory: Negative.    Cardiovascular: Negative.    Gastrointestinal: Positive for abdominal pain and nausea. Negative for constipation, diarrhea and vomiting.   Endocrine: Negative.    Genitourinary: Positive for frequency. Negative for dysuria and hematuria.   Musculoskeletal: Negative.  Negative for myalgias.   Skin: Negative.    Allergic/Immunologic: Negative.    Neurological: Negative.  Negative for headaches.   Hematological: Negative.    Psychiatric/Behavioral: Negative.    All other systems reviewed and are negative.      Objective:      Physical Exam   Constitutional: She is oriented to person, place, and time. She appears well-developed and well-nourished.   HENT:   Head: Normocephalic and atraumatic.   Right Ear: External ear normal.   Left Ear: External ear normal.   Nose: Nose normal.   Mouth/Throat: Oropharynx is clear and moist.   Eyes: Pupils are equal, round, and reactive to light. Conjunctivae and EOM are normal.   Neck: Normal range of motion. Neck supple.   Cardiovascular: Normal rate, regular rhythm, normal heart sounds and intact distal pulses.   Pulmonary/Chest: Effort normal and breath sounds normal.   Abdominal: Soft. Bowel sounds are normal.   Musculoskeletal: Normal range of motion.   Neurological: She is alert and oriented to person, place, and time. She has normal reflexes.   Skin: Skin is warm and dry.   Psychiatric: She has a normal mood and affect. Her behavior is normal. Thought content normal.   Vitals reviewed.      Assessment:       1. Pain of upper abdomen      r/o  gallstones  Plan:       I will send her to get an US and then follow up

## 2020-04-27 NOTE — LETTER
April 27, 2020      MD Saskia Skinner [address]  [place] [address]           [facility]  [address], [address]  [facility] [place] [address]  Phone: [phone]  Fax: [phone]          Patient: Malka De   MR Number: [id number]   YOB: 1950   Date of Visit: 4/27/2020       Dear Dr. Fernanda Chatman:    Thank you for referring Malka De to me for evaluation. Attached you will find relevant portions of my assessment and plan of care.    If you have questions, please do not hesitate to call me. I look forward to following Malka De along with you.    Sincerely,    Ravi Read MD    Enclosure  CC:  No Recipients    If you would like to receive this communication electronically, please contact [email or web address] or [phone] to request more information on [employer] access.    For providers and/or their staff who would like to refer a patient to [facility], please contact us through our one-stop-shop provider referral line, [facility], at [phone].    If you feel you have received this communication in error or would no longer like to receive these types of communications, please e-mail [email or web address]

## 2020-04-27 NOTE — PROGRESS NOTES
Answers for HPI/ROS submitted by the patient on 4/25/2020   Abdominal pain  Chronicity: new  Onset: more than 1 month ago  Onset quality: undetermined  Frequency: 2 to 4 times per day  Episode duration: 2 hours  Progression since onset: gradually improving  Pain location: epigastric region  Pain - numeric: 7/10  Pain quality: burning  anorexia: Yes  belching: Yes  constipation: No  diarrhea: No  dysuria: No  fever: No  flatus: Yes  frequency: Yes  headaches: No  hematochezia: No  hematuria: No  melena: No  myalgias: No  nausea: Yes  weight loss: Yes  vomiting: No  Aggravated by: eating  Relieved by: belching, liquids  Pain severity: moderate  Treatments tried: acetaminophen, antacids  Improvement on treatment: moderate  abdominal surgery: No  colon cancer: No  Crohn's disease: No  gallstones: No  GERD: Yes  irritable bowel syndrome: Yes  kidney stones: No  pancreatitis: No  PUD: No  ulcerative colitis: No  UTI: Yes

## 2020-04-28 ENCOUNTER — TELEPHONE (OUTPATIENT)
Dept: SURGERY | Facility: CLINIC | Age: 70
End: 2020-04-28

## 2020-04-28 NOTE — TELEPHONE ENCOUNTER
Pt notified u/s reviewed by -pts u/s is normal and she needs to f/u with her GI doctor.  Pt will call to schedule appt with GI.      ----- Message from Lucia Medina sent at 4/28/2020 12:36 PM CDT -----  Contact: self  [phone]  Type: Patient Call Back    Who called: self    What is the request in detail: Pt is requesting test results from stomach ultrasound    Can the clinic reply by [facility]? Call back     Would the patient rather a call back or a response via [facility]? Call back     Best call back number: [phone]

## 2020-08-10 ENCOUNTER — TELEPHONE (OUTPATIENT)
Dept: SURGERY | Facility: CLINIC | Age: 70
End: 2020-08-10

## 2020-08-10 NOTE — TELEPHONE ENCOUNTER
Spoke with pt re:message she sent over the weekend.  She states she is feeling better she thinks she had a stomach virus.  Pt instructed to call us for any problems

## 2021-04-12 ENCOUNTER — PATIENT MESSAGE (OUTPATIENT)
Dept: RESEARCH | Facility: HOSPITAL | Age: 71
End: 2021-04-12

## 2021-11-05 ENCOUNTER — CLINICAL SUPPORT (OUTPATIENT)
Dept: URGENT CARE | Facility: CLINIC | Age: 71
End: 2021-11-05
Payer: MEDICARE

## 2021-11-05 DIAGNOSIS — Z20.822 ENCOUNTER FOR LABORATORY TESTING FOR COVID-19 VIRUS: Primary | ICD-10-CM

## 2021-11-05 LAB
CTP QC/QA: YES
SARS-COV-2 RDRP RESP QL NAA+PROBE: NEGATIVE

## 2021-11-05 PROCEDURE — U0002: ICD-10-PCS | Mod: QW,CR,S$GLB, | Performed by: NURSE PRACTITIONER

## 2021-11-05 PROCEDURE — U0002 COVID-19 LAB TEST NON-CDC: HCPCS | Mod: QW,CR,S$GLB, | Performed by: NURSE PRACTITIONER
